# Patient Record
Sex: MALE | Race: WHITE | NOT HISPANIC OR LATINO | Employment: OTHER | URBAN - METROPOLITAN AREA
[De-identification: names, ages, dates, MRNs, and addresses within clinical notes are randomized per-mention and may not be internally consistent; named-entity substitution may affect disease eponyms.]

---

## 2021-01-22 ENCOUNTER — PATIENT MESSAGE (OUTPATIENT)
Dept: ADMINISTRATIVE | Facility: OTHER | Age: 76
End: 2021-01-22

## 2021-04-16 ENCOUNTER — PATIENT MESSAGE (OUTPATIENT)
Dept: RESEARCH | Facility: HOSPITAL | Age: 76
End: 2021-04-16

## 2024-12-01 ENCOUNTER — HOSPITAL ENCOUNTER (EMERGENCY)
Facility: HOSPITAL | Age: 79
Discharge: HOME | End: 2024-12-01
Attending: EMERGENCY MEDICINE
Payer: MEDICARE

## 2024-12-01 VITALS
HEIGHT: 66 IN | HEART RATE: 67 BPM | DIASTOLIC BLOOD PRESSURE: 71 MMHG | WEIGHT: 235 LBS | TEMPERATURE: 99 F | SYSTOLIC BLOOD PRESSURE: 147 MMHG | OXYGEN SATURATION: 94 % | RESPIRATION RATE: 17 BRPM | BODY MASS INDEX: 37.77 KG/M2

## 2024-12-01 DIAGNOSIS — R53.83 FATIGUE: ICD-10-CM

## 2024-12-01 DIAGNOSIS — R31.9 HEMATURIA, UNSPECIFIED TYPE: Primary | ICD-10-CM

## 2024-12-01 LAB
ALBUMIN SERPL BCP-MCNC: 3.5 G/DL (ref 3.5–5.2)
ALP SERPL-CCNC: 105 U/L (ref 40–150)
ALT SERPL W/O P-5'-P-CCNC: 27 U/L (ref 10–44)
ANION GAP SERPL CALC-SCNC: 12 MMOL/L (ref 8–16)
AST SERPL-CCNC: 26 U/L (ref 10–40)
BACTERIA #/AREA URNS HPF: ABNORMAL /HPF
BASOPHILS # BLD AUTO: 0.04 K/UL (ref 0–0.2)
BASOPHILS NFR BLD: 0.5 % (ref 0–1.9)
BILIRUB SERPL-MCNC: 0.7 MG/DL (ref 0.1–1)
BILIRUB UR QL STRIP: NEGATIVE
BNP SERPL-MCNC: 19 PG/ML (ref 0–99)
BUN SERPL-MCNC: 20 MG/DL (ref 8–23)
CALCIUM SERPL-MCNC: 9.5 MG/DL (ref 8.7–10.5)
CHLORIDE SERPL-SCNC: 102 MMOL/L (ref 95–110)
CLARITY UR: ABNORMAL
CO2 SERPL-SCNC: 25 MMOL/L (ref 23–29)
COLOR UR: ABNORMAL
CREAT SERPL-MCNC: 1.4 MG/DL (ref 0.5–1.4)
CTP QC/QA: YES
CTP QC/QA: YES
DIFFERENTIAL METHOD BLD: ABNORMAL
EOSINOPHIL # BLD AUTO: 0.2 K/UL (ref 0–0.5)
EOSINOPHIL NFR BLD: 2.9 % (ref 0–8)
ERYTHROCYTE [DISTWIDTH] IN BLOOD BY AUTOMATED COUNT: 13.2 % (ref 11.5–14.5)
EST. GFR  (NO RACE VARIABLE): 52 ML/MIN/1.73 M^2
GLUCOSE SERPL-MCNC: 153 MG/DL (ref 70–110)
GLUCOSE UR QL STRIP: NEGATIVE
HCT VFR BLD AUTO: 41.4 % (ref 40–54)
HGB BLD-MCNC: 14.3 G/DL (ref 14–18)
HGB UR QL STRIP: ABNORMAL
HYALINE CASTS #/AREA URNS LPF: 0 /LPF
IMM GRANULOCYTES # BLD AUTO: 0.03 K/UL (ref 0–0.04)
IMM GRANULOCYTES NFR BLD AUTO: 0.4 % (ref 0–0.5)
KETONES UR QL STRIP: NEGATIVE
LEUKOCYTE ESTERASE UR QL STRIP: ABNORMAL
LYMPHOCYTES # BLD AUTO: 1.4 K/UL (ref 1–4.8)
LYMPHOCYTES NFR BLD: 18.9 % (ref 18–48)
MCH RBC QN AUTO: 32 PG (ref 27–31)
MCHC RBC AUTO-ENTMCNC: 34.5 G/DL (ref 32–36)
MCV RBC AUTO: 93 FL (ref 82–98)
MICROSCOPIC COMMENT: ABNORMAL
MONOCYTES # BLD AUTO: 0.6 K/UL (ref 0.3–1)
MONOCYTES NFR BLD: 7.4 % (ref 4–15)
NEUTROPHILS # BLD AUTO: 5.3 K/UL (ref 1.8–7.7)
NEUTROPHILS NFR BLD: 69.9 % (ref 38–73)
NITRITE UR QL STRIP: NEGATIVE
NRBC BLD-RTO: 0 /100 WBC
PH UR STRIP: 8 [PH] (ref 5–8)
PLATELET # BLD AUTO: 153 K/UL (ref 150–450)
PMV BLD AUTO: 10.9 FL (ref 9.2–12.9)
POC MOLECULAR INFLUENZA A AGN: NEGATIVE
POC MOLECULAR INFLUENZA B AGN: NEGATIVE
POTASSIUM SERPL-SCNC: 3.5 MMOL/L (ref 3.5–5.1)
PROT SERPL-MCNC: 6.8 G/DL (ref 6–8.4)
PROT UR QL STRIP: ABNORMAL
RBC # BLD AUTO: 4.47 M/UL (ref 4.6–6.2)
RBC #/AREA URNS HPF: >100 /HPF (ref 0–4)
SARS-COV-2 RDRP RESP QL NAA+PROBE: NEGATIVE
SODIUM SERPL-SCNC: 139 MMOL/L (ref 136–145)
SP GR UR STRIP: 1.02 (ref 1–1.03)
TROPONIN I SERPL DL<=0.01 NG/ML-MCNC: 0.02 NG/ML (ref 0–0.03)
TROPONIN I SERPL DL<=0.01 NG/ML-MCNC: 0.03 NG/ML (ref 0–0.03)
TSH SERPL DL<=0.005 MIU/L-ACNC: 3.76 UIU/ML (ref 0.4–4)
URN SPEC COLLECT METH UR: ABNORMAL
UROBILINOGEN UR STRIP-ACNC: NEGATIVE EU/DL
WBC # BLD AUTO: 7.52 K/UL (ref 3.9–12.7)
WBC #/AREA URNS HPF: 0 /HPF (ref 0–5)

## 2024-12-01 PROCEDURE — 93005 ELECTROCARDIOGRAM TRACING: CPT

## 2024-12-01 PROCEDURE — 81000 URINALYSIS NONAUTO W/SCOPE: CPT

## 2024-12-01 PROCEDURE — 87502 INFLUENZA DNA AMP PROBE: CPT

## 2024-12-01 PROCEDURE — 85025 COMPLETE CBC W/AUTO DIFF WBC: CPT

## 2024-12-01 PROCEDURE — 83880 ASSAY OF NATRIURETIC PEPTIDE: CPT

## 2024-12-01 PROCEDURE — 87635 SARS-COV-2 COVID-19 AMP PRB: CPT

## 2024-12-01 PROCEDURE — 93010 ELECTROCARDIOGRAM REPORT: CPT | Mod: ,,, | Performed by: STUDENT IN AN ORGANIZED HEALTH CARE EDUCATION/TRAINING PROGRAM

## 2024-12-01 PROCEDURE — 99285 EMERGENCY DEPT VISIT HI MDM: CPT | Mod: 25

## 2024-12-01 PROCEDURE — 25000003 PHARM REV CODE 250

## 2024-12-01 PROCEDURE — 80053 COMPREHEN METABOLIC PANEL: CPT

## 2024-12-01 PROCEDURE — 84443 ASSAY THYROID STIM HORMONE: CPT

## 2024-12-01 PROCEDURE — 84484 ASSAY OF TROPONIN QUANT: CPT | Mod: 91

## 2024-12-01 RX ORDER — LISINOPRIL 5 MG/1
10 TABLET ORAL DAILY
Qty: 180 TABLET | Refills: 0 | Status: SHIPPED | OUTPATIENT
Start: 2024-12-01 | End: 2025-03-01

## 2024-12-01 RX ORDER — LISINOPRIL 10 MG/1
10 TABLET ORAL
Status: COMPLETED | OUTPATIENT
Start: 2024-12-01 | End: 2024-12-01

## 2024-12-01 RX ORDER — IBUPROFEN 600 MG/1
600 TABLET ORAL
Status: DISCONTINUED | OUTPATIENT
Start: 2024-12-01 | End: 2024-12-01

## 2024-12-01 RX ORDER — ACETAMINOPHEN 500 MG
1000 TABLET ORAL
Status: COMPLETED | OUTPATIENT
Start: 2024-12-01 | End: 2024-12-01

## 2024-12-01 RX ORDER — LISINOPRIL 5 MG/1
10 TABLET ORAL DAILY
Qty: 180 TABLET | Refills: 0 | Status: SHIPPED | OUTPATIENT
Start: 2024-12-01 | End: 2024-12-01

## 2024-12-01 RX ORDER — IBUPROFEN 600 MG/1
600 TABLET ORAL
Status: COMPLETED | OUTPATIENT
Start: 2024-12-01 | End: 2024-12-01

## 2024-12-01 RX ADMIN — IBUPROFEN 600 MG: 600 TABLET, FILM COATED ORAL at 11:12

## 2024-12-01 RX ADMIN — ACETAMINOPHEN 1000 MG: 500 TABLET ORAL at 09:12

## 2024-12-01 RX ADMIN — LISINOPRIL 10 MG: 10 TABLET ORAL at 11:12

## 2024-12-01 NOTE — ED NOTES
Patient oxygen saturation dropping to 87-89% on RA while sleeping. Placed on 2L nasal canula with immediate improvement.

## 2024-12-01 NOTE — ED NOTES
"Patient noted to have red tinged urine upon providing sample. States this is how it has been looking but "I thought it was brown."   "

## 2024-12-01 NOTE — ED NOTES
Patient provided with and educated on discharge instructions, prescriptions, and follow up care. Patient verbalized understanding. Advised to return to ED as needed. Stable upon departure.

## 2024-12-01 NOTE — DISCHARGE INSTRUCTIONS
Your urine has signs of blood which may explain the darkened color recently. We will send a referral to urology which you should follow up with for further management and evaluation. Follow up with your primary care doctor. Return to the ED for new or worsening symptoms

## 2024-12-01 NOTE — ED PROVIDER NOTES
Encounter Date: 12/1/2024       History     Chief Complaint   Patient presents with    Multiple Complaints     Pt reports dark urine x2 weeks. Denies pain or bleeding. Pt also c/o nasal congestion, malaise, and diarrhea starting today. Pt reports he stopped taking his daily meds 2 days ago.      Patient is a 79-year-old male with past medical history significant for hypertension, BPH, renal stone, venous insufficiency who presents for evaluation of multiple complaints.  Patient reports fatigue cough, congestion, and rhinorrhea that began today.  He denies any fevers chills chest pain shortness of breath nausea vomiting numbness or weakness.  Denies any recent sick contacts.  He has not taken anything for his symptoms.  Patient also complains of darkened urine that have occurred over the last 2 weeks.  He denies any dysuria, urinary frequency, urinary retention, or flank pain    The history is provided by the patient.     Review of patient's allergies indicates:  No Known Allergies  Past Medical History:   Diagnosis Date    Hypertension      Past Surgical History:   Procedure Laterality Date    BACK SURGERY      CHOLECYSTECTOMY      PROSTATE SURGERY      WRIST SURGERY      left     No family history on file.  Social History     Tobacco Use    Smoking status: Former    Smokeless tobacco: Never   Substance Use Topics    Alcohol use: No    Drug use: Never     Review of Systems    Physical Exam     Initial Vitals [12/01/24 0854]   BP Pulse Resp Temp SpO2   (!) 183/82 89 20 98.5 °F (36.9 °C) 97 %      MAP       --         Physical Exam    Nursing note and vitals reviewed.  Constitutional: He appears well-developed and well-nourished. No distress.   HENT:   Head: Normocephalic and atraumatic.   Eyes: EOM are normal. Pupils are equal, round, and reactive to light.   Neck: Neck supple. No tracheal deviation present.   Normal range of motion.  Cardiovascular:  Normal rate, regular rhythm and intact distal pulses.           No  murmur heard.  Pulmonary/Chest: Breath sounds normal. No stridor. No respiratory distress. He has no wheezes. He has no rales.   Abdominal: Abdomen is soft. He exhibits no distension. There is no abdominal tenderness. There is no rebound and no guarding.   Musculoskeletal:         General: Normal range of motion.      Cervical back: Normal range of motion and neck supple.     Neurological: He is alert and oriented to person, place, and time. He has normal strength. No cranial nerve deficit or sensory deficit.   Skin: Skin is warm and dry. Capillary refill takes less than 2 seconds.         ED Course   Procedures  Labs Reviewed   COMPREHENSIVE METABOLIC PANEL - Abnormal       Result Value    Sodium 139      Potassium 3.5      Chloride 102      CO2 25      Glucose 153 (*)     BUN 20      Creatinine 1.4      Calcium 9.5      Total Protein 6.8      Albumin 3.5      Total Bilirubin 0.7      Alkaline Phosphatase 105      AST 26      ALT 27      eGFR 52 (*)     Anion Gap 12     CBC W/ AUTO DIFFERENTIAL - Abnormal    WBC 7.52      RBC 4.47 (*)     Hemoglobin 14.3      Hematocrit 41.4      MCV 93      MCH 32.0 (*)     MCHC 34.5      RDW 13.2      Platelets 153      MPV 10.9      Immature Granulocytes 0.4      Gran # (ANC) 5.3      Immature Grans (Abs) 0.03      Lymph # 1.4      Mono # 0.6      Eos # 0.2      Baso # 0.04      nRBC 0      Gran % 69.9      Lymph % 18.9      Mono % 7.4      Eosinophil % 2.9      Basophil % 0.5      Differential Method Automated     TROPONIN I - Abnormal    Troponin I 0.032 (*)    URINALYSIS, REFLEX TO URINE CULTURE - Abnormal    Specimen UA Urine, Clean Catch      Color, UA Orange (*)     Appearance, UA Cloudy (*)     pH, UA 8.0      Specific Gravity, UA 1.020      Protein, UA 1+ (*)     Glucose, UA Negative      Ketones, UA Negative      Bilirubin (UA) Negative      Occult Blood UA 3+ (*)     Nitrite, UA Negative      Urobilinogen, UA Negative      Leukocytes, UA Trace (*)     Narrative:      Specimen Source->Urine   URINALYSIS MICROSCOPIC - Abnormal    RBC, UA >100 (*)     WBC, UA 0      Bacteria None      Hyaline Casts, UA 0      Microscopic Comment SEE COMMENT      Narrative:     Specimen Source->Urine   B-TYPE NATRIURETIC PEPTIDE    BNP 19     TSH    TSH 3.757     TROPONIN I    Troponin I 0.022     POCT INFLUENZA A/B MOLECULAR    POC Molecular Influenza A Ag Negative      POC Molecular Influenza B Ag Negative       Acceptable Yes     SARS-COV-2 RDRP GENE    POC Rapid COVID Negative       Acceptable Yes            Imaging Results              X-Ray Chest AP Portable (Final result)  Result time 12/01/24 09:24:15      Final result by Angella Mar MD (12/01/24 09:24:15)                   Impression:      As above.      Electronically signed by: Angella Mar MD  Date:    12/01/2024  Time:    09:24               Narrative:    EXAMINATION:  XR CHEST AP PORTABLE    CLINICAL HISTORY:  fatigue;    TECHNIQUE:  Single frontal view of the chest was performed.    COMPARISON:  12/27/2013    FINDINGS:  The heart is enlarged.  Left basilar opacity which could reflect atelectasis, aspiration or pneumonia.  Age-appropriate degenerative changes affect the skeleton.                                       Medications   acetaminophen tablet 1,000 mg (1,000 mg Oral Given 12/1/24 0918)   ibuprofen tablet 600 mg (600 mg Oral Given 12/1/24 1129)   lisinopriL tablet 10 mg (10 mg Oral Given 12/1/24 1143)     Medical Decision Making  Pt presents for fatigue and dark urine. Ddx: ACS, arrhythmia, electrolyte abnormality, covid, flu, pneumonia, UTI, pyelonephritis, renal colic, dehydration. Pt well appearing and in no acute distress. Afebrile and hemodynamically stable. Labs without leukocytosis, anemia, or electrolyte abnormality. No evidence of JOSE. CXR without acute process. EKG without ST elevation. Trop mildly elevated, repeat within normal limits. Pt without chest pain throughout ED course.  Doubt ACS. Pt with congestion and rhinorrhea on exam. Although covid and flu negative, concern for viral etiology of pt's fatigue. He reports improvement with tylenol. UA without evidence of infection although with >100 RBCs. Pt without flank pain or urinary retention. No intractable pain, JOSE, or evidence of infection in setting of hematuria. Will defer on imaging at this time. Plan to discharge to follow up with urology. Return precautions advised     Amount and/or Complexity of Data Reviewed  Labs: ordered.  Radiology: ordered.    Risk  OTC drugs.  Prescription drug management.                                      Clinical Impression:  Final diagnoses:  [R53.83] Fatigue  [R31.9] Hematuria, unspecified type (Primary)          ED Disposition Condition    Discharge Stable          ED Prescriptions       Medication Sig Dispense Start Date End Date Auth. Provider    lisinopriL (PRINIVIL,ZESTRIL) 5 MG tablet  (Status: Discontinued) Take 2 tablets (10 mg total) by mouth once daily. 180 tablet 12/1/2024 12/1/2024 Vicente Sun Jr., MD    lisinopriL (PRINIVIL,ZESTRIL) 5 MG tablet Take 2 tablets (10 mg total) by mouth once daily. 180 tablet 12/1/2024 3/1/2025 Vicente Sun Jr., MD          Follow-up Information       Follow up With Specialties Details Why Contact Info    Kit Harmon MD Family Medicine In 1 week  [address] [facility]  [address]  [facility]  [place] [id number]  [phone]               Vicente Sun Jr., MD  Resident  12/01/24 5557

## 2024-12-01 NOTE — ED NOTES
"Patient present to the ED with c/o dark colored urine, almost brown, x 2 weeks. States "I do have some kidney issues" but unable to elaborated on what this means. Pt denies seeing nephrology or urology in the past; however, does endorse history of kidney stones. Patient states he waited to be seen d/t thinking it was just a stone. Denies any pain. Denies urgency, frequency, dysuria, or retention. Patient states he has attempted to increase his water intake without improvement. Patient further explains that he quit all his daily meds two days ago d/t reading possible causes of dark urine. Patient further c/o nasal congestion, nausea and diarrhea that began upon awakening. Denies contact with anyone sick. Denies fever, headache, cough, abd pain. Patient does sound congested and continuously blowing nose during assessment. Patient updated on care plan. IV established and blood work sent to lab. Placed onto continuous cardiac, BP, and O2 monitoring. Pt noted to be hypertensive with history of same. Patient alert and oriented. Medicated per MAR. Updated on the need for urine sample. Provided with urinal and call light and instructed to call out when able to void. VU. Denies needs at this time. Nadn. Safety intact. Care ongoing.   "

## 2024-12-02 LAB
OHS QRS DURATION: 80 MS
OHS QTC CALCULATION: 453 MS